# Patient Record
Sex: MALE | Race: WHITE | NOT HISPANIC OR LATINO | Employment: FULL TIME | URBAN - NONMETROPOLITAN AREA
[De-identification: names, ages, dates, MRNs, and addresses within clinical notes are randomized per-mention and may not be internally consistent; named-entity substitution may affect disease eponyms.]

---

## 2021-07-14 DIAGNOSIS — R19.4 CHANGE IN BOWEL HABIT: Primary | ICD-10-CM

## 2021-07-16 DIAGNOSIS — R19.4 CHANGE IN BOWEL HABIT: Primary | ICD-10-CM

## 2021-07-16 DIAGNOSIS — Z00.00 WELLNESS EXAMINATION: ICD-10-CM

## 2021-07-16 RX ORDER — LIDOCAINE HYDROCHLORIDE 10 MG/ML
1 INJECTION, SOLUTION EPIDURAL; INFILTRATION; INTRACAUDAL; PERINEURAL ONCE
Status: CANCELLED | OUTPATIENT
Start: 2021-07-16 | End: 2021-07-16

## 2021-07-16 RX ORDER — SODIUM CHLORIDE 0.9 % (FLUSH) 0.9 %
10 SYRINGE (ML) INJECTION
Status: CANCELLED | OUTPATIENT
Start: 2021-07-16

## 2021-07-16 RX ORDER — SODIUM CHLORIDE 9 MG/ML
INJECTION, SOLUTION INTRAVENOUS CONTINUOUS
Status: CANCELLED | OUTPATIENT
Start: 2021-07-22

## 2021-07-20 ENCOUNTER — ANESTHESIA EVENT (OUTPATIENT)
Dept: ENDOSCOPY | Facility: HOSPITAL | Age: 36
End: 2021-07-20
Payer: COMMERCIAL

## 2021-07-22 ENCOUNTER — HOSPITAL ENCOUNTER (OUTPATIENT)
Dept: PREADMISSION TESTING | Facility: HOSPITAL | Age: 36
Discharge: HOME | End: 2021-07-22
Attending: SURGERY
Payer: COMMERCIAL

## 2021-07-22 ENCOUNTER — LAB VISIT (OUTPATIENT)
Dept: LAB | Facility: HOSPITAL | Age: 36
End: 2021-07-22
Attending: SURGERY
Payer: COMMERCIAL

## 2021-07-22 VITALS — WEIGHT: 158 LBS | HEIGHT: 70 IN | BODY MASS INDEX: 22.62 KG/M2

## 2021-07-22 DIAGNOSIS — R19.4 CHANGE IN BOWEL HABIT: ICD-10-CM

## 2021-07-22 DIAGNOSIS — Z00.00 WELLNESS EXAMINATION: ICD-10-CM

## 2021-07-22 LAB
BILIRUB UR QL STRIP: NEGATIVE
CLARITY UR: CLEAR
COLOR UR: YELLOW
GLUCOSE UR QL STRIP: NEGATIVE
HGB UR QL STRIP: NEGATIVE
KETONES UR QL STRIP: NEGATIVE
LEUKOCYTE ESTERASE UR QL STRIP: NEGATIVE
NITRITE UR QL STRIP: NEGATIVE
PH UR STRIP: 8 [PH] (ref 5–8)
PROT UR QL STRIP: NEGATIVE
SP GR UR STRIP: 1.01 (ref 1–1.03)
URN SPEC COLLECT METH UR: NORMAL
UROBILINOGEN UR STRIP-ACNC: NEGATIVE EU/DL

## 2021-07-22 PROCEDURE — 81003 URINALYSIS AUTO W/O SCOPE: CPT | Performed by: SURGERY

## 2021-07-25 PROBLEM — R19.4 CHANGE IN BOWEL HABITS: Chronic | Status: ACTIVE | Noted: 2021-07-01

## 2021-07-26 ENCOUNTER — ANESTHESIA (OUTPATIENT)
Dept: ENDOSCOPY | Facility: HOSPITAL | Age: 36
End: 2021-07-26
Payer: COMMERCIAL

## 2021-07-26 ENCOUNTER — HOSPITAL ENCOUNTER (OUTPATIENT)
Facility: HOSPITAL | Age: 36
Discharge: HOME | End: 2021-07-26
Attending: SURGERY | Admitting: SURGERY
Payer: COMMERCIAL

## 2021-07-26 VITALS
RESPIRATION RATE: 20 BRPM | OXYGEN SATURATION: 100 % | SYSTOLIC BLOOD PRESSURE: 106 MMHG | TEMPERATURE: 98 F | HEART RATE: 62 BPM | DIASTOLIC BLOOD PRESSURE: 62 MMHG

## 2021-07-26 DIAGNOSIS — R19.4 CHANGE IN BOWEL HABIT: ICD-10-CM

## 2021-07-26 DIAGNOSIS — R19.4 CHANGE IN BOWEL HABITS: Primary | Chronic | ICD-10-CM

## 2021-07-26 DIAGNOSIS — Z01.818 PRE-OP TESTING: ICD-10-CM

## 2021-07-26 PROBLEM — K58.9 IRRITABLE BOWEL SYNDROME WITHOUT DIARRHEA: Chronic | Status: ACTIVE | Noted: 2021-07-26

## 2021-07-26 PROCEDURE — 37000008 HC ANESTHESIA 1ST 15 MINUTES: Performed by: SURGERY

## 2021-07-26 PROCEDURE — 45378 DIAGNOSTIC COLONOSCOPY: CPT | Performed by: SURGERY

## 2021-07-26 PROCEDURE — 25000003 PHARM REV CODE 250: Performed by: SURGERY

## 2021-07-26 PROCEDURE — 37000009 HC ANESTHESIA EA ADD 15 MINS: Performed by: SURGERY

## 2021-07-26 RX ORDER — PROPOFOL 10 MG/ML
VIAL (ML) INTRAVENOUS
Status: DISCONTINUED | OUTPATIENT
Start: 2021-07-26 | End: 2021-07-26

## 2021-07-26 RX ORDER — SODIUM CHLORIDE 9 MG/ML
INJECTION, SOLUTION INTRAVENOUS CONTINUOUS
Status: DISCONTINUED | OUTPATIENT
Start: 2021-07-26 | End: 2021-07-26 | Stop reason: HOSPADM

## 2021-07-26 RX ORDER — SODIUM CHLORIDE 9 MG/ML
INJECTION, SOLUTION INTRAVENOUS CONTINUOUS PRN
Status: DISCONTINUED | OUTPATIENT
Start: 2021-07-26 | End: 2021-07-26

## 2021-07-26 RX ORDER — LIDOCAINE HYDROCHLORIDE 10 MG/ML
1 INJECTION, SOLUTION EPIDURAL; INFILTRATION; INTRACAUDAL; PERINEURAL ONCE
Status: DISCONTINUED | OUTPATIENT
Start: 2021-07-26 | End: 2021-07-26 | Stop reason: HOSPADM

## 2021-07-26 RX ORDER — SODIUM CHLORIDE 0.9 % (FLUSH) 0.9 %
10 SYRINGE (ML) INJECTION
Status: DISCONTINUED | OUTPATIENT
Start: 2021-07-26 | End: 2021-07-26 | Stop reason: HOSPADM

## 2021-07-26 RX ORDER — LIDOCAINE HYDROCHLORIDE 10 MG/ML
INJECTION, SOLUTION INTRAVENOUS
Status: DISCONTINUED | OUTPATIENT
Start: 2021-07-26 | End: 2021-07-26

## 2021-07-26 RX ADMIN — Medication 50 MG: at 08:07

## 2021-07-26 RX ADMIN — Medication 150 MG: at 08:07

## 2021-07-26 RX ADMIN — SODIUM CHLORIDE: 0.9 INJECTION, SOLUTION INTRAVENOUS at 07:07

## 2021-07-26 RX ADMIN — LIDOCAINE HYDROCHLORIDE 50 MG: 10 INJECTION, SOLUTION INTRAVENOUS at 08:07

## 2021-07-30 ENCOUNTER — LAB VISIT (OUTPATIENT)
Dept: LAB | Facility: HOSPITAL | Age: 36
End: 2021-07-30
Attending: FAMILY MEDICINE
Payer: COMMERCIAL

## 2021-07-30 DIAGNOSIS — R11.0 NAUSEA: ICD-10-CM

## 2021-07-30 DIAGNOSIS — R51.9 HEAD ACHE: ICD-10-CM

## 2021-07-30 LAB — SARS-COV-2 RNA RESP QL NAA+PROBE: NOT DETECTED

## 2021-07-30 PROCEDURE — U0002 COVID-19 LAB TEST NON-CDC: HCPCS | Performed by: FAMILY MEDICINE

## 2021-08-09 PROBLEM — R53.82 CHRONIC FATIGUE: Status: ACTIVE | Noted: 2021-08-09

## 2021-08-09 PROBLEM — E55.9 VITAMIN D DEFICIENCY: Status: ACTIVE | Noted: 2021-08-09

## 2025-01-13 PROBLEM — Z76.89 ENCOUNTER TO ESTABLISH CARE: Status: ACTIVE | Noted: 2025-01-13

## 2025-01-31 PROBLEM — R73.01 IFG (IMPAIRED FASTING GLUCOSE): Status: ACTIVE | Noted: 2025-01-31

## 2025-01-31 PROBLEM — E78.5 HYPERLIPIDEMIA: Status: ACTIVE | Noted: 2025-01-31

## 2025-01-31 PROBLEM — R73.03 PREDIABETES: Status: ACTIVE | Noted: 2025-01-31

## 2025-01-31 PROBLEM — R00.1 SINUS BRADYCARDIA: Status: ACTIVE | Noted: 2025-01-31

## 2025-01-31 PROBLEM — Z00.00 ROUTINE GENERAL MEDICAL EXAMINATION AT A HEALTH CARE FACILITY: Status: ACTIVE | Noted: 2025-01-31

## 2025-07-28 ENCOUNTER — HOSPITAL ENCOUNTER (OUTPATIENT)
Facility: HOSPITAL | Age: 40
Discharge: HOME | End: 2025-07-29
Attending: EMERGENCY MEDICINE | Admitting: INTERNAL MEDICINE
Payer: COMMERCIAL

## 2025-07-28 DIAGNOSIS — I63.9 CEREBROVASCULAR ACCIDENT (CVA), UNSPECIFIED MECHANISM: Primary | ICD-10-CM

## 2025-07-28 DIAGNOSIS — R53.1 WEAKNESS: ICD-10-CM

## 2025-07-28 LAB
ABSOLUTE EOSINOPHIL (OHS): 0.23 K/UL
ABSOLUTE MONOCYTE (OHS): 0.67 K/UL (ref 0.3–1)
ABSOLUTE NEUTROPHIL COUNT (OHS): 4.04 K/UL (ref 1.8–7.7)
ALBUMIN SERPL BCP-MCNC: 4 G/DL (ref 3.5–5.2)
ALP SERPL-CCNC: 59 UNIT/L (ref 40–150)
ALT SERPL W/O P-5'-P-CCNC: 19 UNIT/L (ref 10–44)
ANION GAP (OHS): 8 MMOL/L (ref 8–16)
AST SERPL-CCNC: 24 UNIT/L (ref 11–45)
BASOPHILS # BLD AUTO: 0.05 K/UL
BASOPHILS NFR BLD AUTO: 0.7 %
BILIRUB SERPL-MCNC: 0.3 MG/DL (ref 0.1–1)
BUN SERPL-MCNC: 29 MG/DL (ref 6–20)
CALCIUM SERPL-MCNC: 8.4 MG/DL (ref 8.7–10.5)
CHLORIDE SERPL-SCNC: 104 MMOL/L (ref 95–110)
CO2 SERPL-SCNC: 26 MMOL/L (ref 23–29)
CREAT SERPL-MCNC: 1.1 MG/DL (ref 0.5–1.4)
ERYTHROCYTE [DISTWIDTH] IN BLOOD BY AUTOMATED COUNT: 12.3 % (ref 11.5–14.5)
GFR SERPLBLD CREATININE-BSD FMLA CKD-EPI: >60 ML/MIN/1.73/M2
GLUCOSE SERPL-MCNC: 99 MG/DL (ref 70–110)
HCT VFR BLD AUTO: 35.9 % (ref 40–54)
HGB BLD-MCNC: 12.3 GM/DL (ref 14–18)
IMM GRANULOCYTES # BLD AUTO: 0.03 K/UL (ref 0–0.04)
IMM GRANULOCYTES NFR BLD AUTO: 0.4 % (ref 0–0.5)
LYMPHOCYTES # BLD AUTO: 1.81 K/UL (ref 1–4.8)
MCH RBC QN AUTO: 32.3 PG (ref 27–31)
MCHC RBC AUTO-ENTMCNC: 34.3 G/DL (ref 32–36)
MCV RBC AUTO: 94 FL (ref 82–98)
NUCLEATED RBC (/100WBC) (OHS): 0 /100 WBC
PLATELET # BLD AUTO: 171 K/UL (ref 150–450)
PMV BLD AUTO: 10.1 FL (ref 9.2–12.9)
POCT GLUCOSE: 97 MG/DL (ref 70–110)
POTASSIUM SERPL-SCNC: 3.8 MMOL/L (ref 3.5–5.1)
PROT SERPL-MCNC: 6.6 GM/DL (ref 6–8.4)
RBC # BLD AUTO: 3.81 M/UL (ref 4.6–6.2)
RELATIVE EOSINOPHIL (OHS): 3.4 %
RELATIVE LYMPHOCYTE (OHS): 26.5 % (ref 18–48)
RELATIVE MONOCYTE (OHS): 9.8 % (ref 4–15)
RELATIVE NEUTROPHIL (OHS): 59.2 % (ref 38–73)
SODIUM SERPL-SCNC: 138 MMOL/L (ref 136–145)
WBC # BLD AUTO: 6.83 K/UL (ref 3.9–12.7)

## 2025-07-28 PROCEDURE — G0378 HOSPITAL OBSERVATION PER HR: HCPCS

## 2025-07-28 PROCEDURE — 82962 GLUCOSE BLOOD TEST: CPT

## 2025-07-28 PROCEDURE — 25000003 PHARM REV CODE 250: Performed by: INTERNAL MEDICINE

## 2025-07-28 PROCEDURE — 25500020 PHARM REV CODE 255: Performed by: EMERGENCY MEDICINE

## 2025-07-28 PROCEDURE — 80053 COMPREHEN METABOLIC PANEL: CPT | Performed by: EMERGENCY MEDICINE

## 2025-07-28 PROCEDURE — 99285 EMERGENCY DEPT VISIT HI MDM: CPT | Mod: 25

## 2025-07-28 PROCEDURE — 36415 COLL VENOUS BLD VENIPUNCTURE: CPT | Performed by: EMERGENCY MEDICINE

## 2025-07-28 PROCEDURE — 85025 COMPLETE CBC W/AUTO DIFF WBC: CPT | Performed by: EMERGENCY MEDICINE

## 2025-07-28 PROCEDURE — G0425 INPT/ED TELECONSULT30: HCPCS | Mod: GT,,, | Performed by: STUDENT IN AN ORGANIZED HEALTH CARE EDUCATION/TRAINING PROGRAM

## 2025-07-28 RX ORDER — SODIUM CHLORIDE 0.9 % (FLUSH) 0.9 %
10 SYRINGE (ML) INJECTION
Status: DISCONTINUED | OUTPATIENT
Start: 2025-07-28 | End: 2025-07-29 | Stop reason: HOSPADM

## 2025-07-28 RX ORDER — TALC
6 POWDER (GRAM) TOPICAL NIGHTLY PRN
Status: DISCONTINUED | OUTPATIENT
Start: 2025-07-28 | End: 2025-07-29 | Stop reason: HOSPADM

## 2025-07-28 RX ORDER — ASPIRIN 325 MG
325 TABLET ORAL DAILY
Status: DISCONTINUED | OUTPATIENT
Start: 2025-07-28 | End: 2025-07-29 | Stop reason: HOSPADM

## 2025-07-28 RX ORDER — ACETAMINOPHEN 325 MG/1
650 TABLET ORAL EVERY 8 HOURS PRN
Status: DISCONTINUED | OUTPATIENT
Start: 2025-07-28 | End: 2025-07-29 | Stop reason: HOSPADM

## 2025-07-28 RX ORDER — KETOROLAC TROMETHAMINE 30 MG/ML
15 INJECTION, SOLUTION INTRAMUSCULAR; INTRAVENOUS EVERY 6 HOURS PRN
Status: DISCONTINUED | OUTPATIENT
Start: 2025-07-28 | End: 2025-07-29 | Stop reason: HOSPADM

## 2025-07-28 RX ORDER — ATORVASTATIN CALCIUM 80 MG/1
80 TABLET, FILM COATED ORAL NIGHTLY
Status: DISCONTINUED | OUTPATIENT
Start: 2025-07-28 | End: 2025-07-29 | Stop reason: HOSPADM

## 2025-07-28 RX ORDER — ONDANSETRON HYDROCHLORIDE 2 MG/ML
4 INJECTION, SOLUTION INTRAVENOUS EVERY 8 HOURS PRN
Status: DISCONTINUED | OUTPATIENT
Start: 2025-07-28 | End: 2025-07-29 | Stop reason: HOSPADM

## 2025-07-28 RX ADMIN — ATORVASTATIN CALCIUM 80 MG: 80 TABLET, FILM COATED ORAL at 08:07

## 2025-07-28 RX ADMIN — ASPIRIN 325 MG ORAL TABLET 325 MG: 325 PILL ORAL at 07:07

## 2025-07-28 RX ADMIN — IOHEXOL 100 ML: 350 INJECTION, SOLUTION INTRAVENOUS at 05:07

## 2025-07-28 NOTE — SUBJECTIVE & OBJECTIVE
HPI:  39 y.o. male with a history of IBS, Vit D deficiency, chronic fatigue, prediabetes, HLD who presents with weakness in the right hand and confusion.   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 Symptoms improving.    Denies any pain.    No history of tobacco use.  Not on any antithrombotics.    Exam ->  Follows commands. No aphasia, dysarthria, facial weakness.  No drift in the UE or LE.   symmetric  Mild weakness with R wrist flexion/extension compared to the L     Images personally reviewed and interpreted:  Study: Head CT and CTA Head & Neck  Study Interpretation: No acute ischemia or hemorrhage. No HDV sign.  No evidence of web or dissection. No LVO/MeVO \     Assessment and plan:  38 y/o male wwith history as above who presents with right hand weakness and confusion.    His exam is essentially normal apart from possibly mild weakness with R wrist flexion/extension.  Given his mild and improving symptoms (able to  phone now), defer IV lytic therapy.    MRI brain    If acute ischemia seen ->  -- Load with ASA 325mg and Plavix 300mg and continue ASA 81mg and Plavix 75mg   -- Admit for stroke workup including TTE w/ bubble and PT/OT eval.  -- Vasc Neuro consult for further workup and recs.    Lytics recommendation: Thrombolytic therapy not recommended due to Mild Non-Disabling Symptoms and Symptoms improving.    Thrombectomy recommendation: No; No large vessel occlusion identified on imaging   Placement recommendation: pending further studies

## 2025-07-28 NOTE — ED PROVIDER NOTES
"Encounter Date: 7/28/2025       History     Chief Complaint   Patient presents with    Extremity Weakness     Patient to the ER with complaints of right hand weakness. Patient reports symptoms occurred while typing onset 15 minutes ago. States, "I was trying to do something on the computer but I couldn't control my right hand." Patient reports confusion.     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 began with right hand weakness, hard to right, hard to use a computer, states has improved somewhat since he has a arrived.  No history of this in the past.  States he was somewhat confused as well.  Now answers all questions appropriately.  Alert and oriented x4, GCS is 15        Review of patient's allergies indicates:  No Known Allergies  Past Medical History:   Diagnosis Date    Change in bowel habits 07/2021    Encounter to establish care 1/13/2025    H. pylori infection     Rectal bleeding 07/2021     Past Surgical History:   Procedure Laterality Date    COLONOSCOPY N/A 07/26/2021    Procedure: COLONOSCOPY;  Surgeon: Emanuel Ybarra MD;  Location: [facility];  Service: General;  Laterality: N/A;  cl/sb/tm/ah  4 8am    VASECTOMY      WISDOM TOOTH EXTRACTION       Family History   Problem Relation Name Age of Onset    No Known Problems Mother      No Known Problems Father      No Known Problems Sister       Social History[1]  Review of Systems   Constitutional:  Negative for fever.   HENT:  Negative for sore throat.    Respiratory:  Negative for shortness of breath.    Cardiovascular:  Negative for chest pain.   Gastrointestinal:  Negative for nausea.   Genitourinary:  Negative for dysuria.   Musculoskeletal:  Negative for back pain.   Skin:  Negative for rash.   Neurological:  Positive for weakness.   Hematological:  Does not bruise/bleed easily.   All other systems reviewed and are negative.      Physical Exam     Initial Vitals [07/28/25 1720]   BP Pulse Resp Temp " SpO2   (!) 143/79 78 18 98.5 °F (36.9 °C) 100 %      MAP       --         Physical Exam    Nursing note and vitals reviewed.  Constitutional: He appears well-developed and well-nourished. He is not diaphoretic. No distress.   HENT:   Head: Normocephalic and atraumatic.   Eyes: Conjunctivae and EOM are normal. Pupils are equal, round, and reactive to light. Right eye exhibits no discharge. Left eye exhibits no discharge. No scleral icterus.   Neck: Neck supple. No JVD present.   Normal range of motion.  Cardiovascular:  Normal rate, regular rhythm, normal heart sounds and intact distal pulses.           No murmur heard.  Pulmonary/Chest: Breath sounds normal. No stridor. No respiratory distress. He has no wheezes. He has no rhonchi. He has no rales. He exhibits no tenderness.   Abdominal: Abdomen is soft. Bowel sounds are normal. He exhibits no distension and no mass. There is no abdominal tenderness. There is no rebound and no guarding.   Musculoskeletal:         General: No tenderness or edema. Normal range of motion.      Cervical back: Normal range of motion and neck supple.     Neurological: He is alert and oriented to person, place, and time. He has normal strength. GCS score is 15. GCS eye subscore is 4. GCS verbal subscore is 5. GCS motor subscore is 6.   Patient appears to be moving hand well, not appear to be weak   Skin: Skin is warm and dry. Capillary refill takes less than 2 seconds.   Psychiatric:   Anxious male         ED Course   Critical Care    Date/Time: 7/28/2025 6:24 PM    Performed by: Lan Albarran Jr., MD  Authorized by: Lan Albarran Jr., MD  Direct patient critical care time: 30 minutes  Additional history critical care time: 15 minutes  Ordering / reviewing critical care time: 15 minutes  Documentation critical care time: 30 minutes  Consulting other physicians critical care time: 15 minutes  Total critical care time (exclusive of procedural time) : 105 minutes  Critical care was  necessary to treat or prevent imminent or life-threatening deterioration of the following conditions: cardiac failure, dehydration, sepsis, trauma, metabolic crisis, circulatory failure, endocrine crisis, renal failure, shock, toxidrome, respiratory failure, CNS failure or compromise and hepatic failure.  Critical care was time spent personally by me on the following activities: discussions with primary provider, discussions with consultants, evaluation of patient's response to treatment, examination of patient, obtaining history from patient or surrogate, ordering and performing treatments and interventions, ordering and review of laboratory studies, ordering and review of radiographic studies, pulse oximetry, re-evaluation of patient's condition and review of old charts.        Labs Reviewed   COMPREHENSIVE METABOLIC PANEL - Abnormal       Result Value    Sodium 138      Potassium 3.8      Chloride 104      CO2 26      Glucose 99      BUN 29 (*)     Creatinine 1.1      Calcium 8.4 (*)     Protein Total 6.6      Albumin 4.0      Bilirubin Total 0.3      ALP 59      AST 24      ALT 19      Anion Gap 8      eGFR >60     CBC WITH DIFFERENTIAL - Abnormal    WBC 6.83      RBC 3.81 (*)     HGB 12.3 (*)     HCT 35.9 (*)     MCV 94      MCH 32.3 (*)     MCHC 34.3      RDW 12.3      Platelet Count 171      MPV 10.1      Nucleated RBC 0      Neut % 59.2      Lymph % 26.5      Mono % 9.8      Eos % 3.4      Basophil % 0.7      Imm Grans % 0.4      Neut # 4.04      Lymph # 1.81      Mono # 0.67      Eos # 0.23      Baso # 0.05      Imm Grans # 0.03     CBC W/ AUTO DIFFERENTIAL    Narrative:     The following orders were created for panel order CBC auto differential.  Procedure                               Abnormality         Status                     ---------                               -----------         ------                     CBC with Differential[[id number]]       Abnormal            Final result                  Please view results for these tests on the individual orders.   POCT GLUCOSE    POCT Glucose 97            Imaging Results              CTA Head and Neck (xpd) (Final result)  Result time 07/28/25 18:22:44      Final result by Bairon Caban MD (07/28/25 18:22:44)                   Impression:      1. No evidence for significant vertebral artery or cervical carotid artery stenosis.  2. No evidence for intracranial aneurysm, stenosis, or significant vaso occlusive disease within the anterior or posterior circulation.      Electronically signed by: Bairon Caban MD  Date:    07/28/2025  Time:    18:22               Narrative:    EXAMINATION:  CTA HEAD AND NECK (XPD)    CLINICAL HISTORY:  Stroke/TIA, determine embolic source;    TECHNIQUE:  Helical CT images obtained from the thoracic inlet through the skull vertex after intravenous administration of contrast according to CTA protocol.  Maximum intensity projection images were evaluated in multiple planes along with the source images. Iterative reconstruction technique was used. NASCET criteria utilized for stenoses measurements.  CT/Cardiac nuclear examinations in the past 12 months: 0    COMPARISON:  Noncontrast CT head performed on today's date    FINDINGS:  Vasculature:    Neck: Imaged portions of the aortic arch are widely patent.  Brachiocephalic artery and bilateral subclavian arteries are widely patent.  Bilateral vertebral arteries originate from the subclavian arteries and are widely patent throughout their course.  Bilateral common carotid, external carotid, and cervical portions of the internal carotid arteries are widely patent.  The carotid bulbs are unremarkable bilaterally.    Head: Distal vertebral arteries supply an intact widely patent basilar artery.  Intact bilateral superior cerebellar and posterior cerebral arteries are seen arising from the distal basilar artery.  Intact left posterior communicating artery felt to be seen.  Right posterior  communicating artery is not clearly seen which may be related to congenital absence or hypoplasia.  Petrous, cavernous, and siphon portions of bilateral internal carotid arteries are widely patent.  Bilateral middle and anterior cerebral arteries are widely patent.  No detected evidence for aneurysm, stenosis, or occlusion within the anterior or posterior circulation.    Remaining structures:    Neck: Thyroid, bilateral submandibular, and bilateral parotid glands are symmetric and unremarkable.  Imaged lung apices are clear.  No detected cervical mass or evidence of cervical adenopathy.  Musculature of the floor of the mouth is midline.  Mucosal surfaces of the nasopharynx and oropharynx are relatively symmetric.  Epiglottis is normal in appearance.  Region of the vocal cords unremarkable.    Head: Paranasal sinuses demonstrate mucous retention cysts at the inferior maxillary sinuses.  Mastoid air cells are clear.  No interval developed midline shift or mass effect.  No enhancing intracranial mass is identified.                                       CT Head Without Contrast (Final result)  Result time 07/28/25 18:02:42      Final result by Bairon Caban MD (07/28/25 18:02:42)                   Impression:      No acute intracranial process detected.      Electronically signed by: Bairon Caban MD  Date:    07/28/2025  Time:    18:02               Narrative:    EXAMINATION:  CT HEAD WITHOUT CONTRAST    CLINICAL HISTORY:  Right hand weakness, confusion stroke, follow up;    TECHNIQUE:  Axial CT images were obtained from the skull base to the vertex without contrast. Iterative reconstruction technique was used.  CT/Cardiac nuclear examinations in the past 12 months: 0    COMPARISON:  No priors available    FINDINGS:  Ventricles and basal cisterns are midline and without effacement. No evidence of acute hemorrhage, midline shift, mass, or mass effect. No evidence of acute regional infarct. No detected extra-axial fluid  collections. Mucous retention cysts are present within bilateral maxillary sinuses largest measuring 7.5 mm.  Remainder of the imaged paranasal sinuses and mastoid air cells are clear.  Calvarium is intact.                                       Medications   iohexoL (OMNIPAQUE 350) injection 100 mL (100 mLs Intravenous Given 7/28/25 1566)     Medical Decision Making  Amount and/or Complexity of Data Reviewed  Labs: ordered.  Radiology: ordered.    Risk  Prescription drug management.  Decision regarding hospitalization.                           Medical Decision Making:   Differential Diagnosis:   Anxiety reaction, neuropathy  Clinical Tests:   Lab Tests: Ordered and Reviewed  The following lab test(s) were unremarkable: CBC and CMP  Radiological Study: Ordered and Reviewed  Medical Tests: Reviewed and Ordered  ED Management:  Discussed patient's presentation and CT report with on-call neurologist.  He recommends no tPA Plavix or aspirin.  He reports can not completely rule out any acute infarct to MRI.  Discussed with patient and family unable to currently patient will require admission for MRI in the morning.  Patient reports he has no current complaints of symptoms.  His primary care provider is .   Other:   I have discussed this case with another health care provider.       <> Summary of the Discussion: Discussed with on-call physician Dr. Whalen accepted admission                Clinical Impression:  Final diagnoses:  [I63.9] Cerebrovascular accident (CVA), unspecified mechanism (Primary)  [R53.1] Weakness          ED Disposition Condition    Admit                       [1]   Social History  Tobacco Use    Smoking status: Never    Smokeless tobacco: Never   Substance Use Topics    Alcohol use: Not Currently    Drug use: Never        Lan Albarran Jr., MD  07/28/25 [id number]

## 2025-07-28 NOTE — TELEMEDICINE CONSULT
[facility]  Vascular Neurology  Comprehensive Stroke Center  TeleVascular Neurology Acute Consultation Note        Consult Information  Consult to Telemedicine - Acute Stroke  Consult performed by: Christofer Tom MD  Consult ordered by: London Aguiar MD          Consulting Provider: LONDON AGUIAR   Current Providers  No providers found    Patient Location:  [facility] EMERGENCY DEPARTMENT Emergency Department    Spoke hospital nurse at bedside with patient assisting consultant.  Patient information was obtained from patient, past medical records, and ER records.       Stroke Documentation  Acute Stroke Times   Last Known Normal Date: 07/28/25  Last Known Normal Time: 1705  Stroke Team Called Date: 07/28/25  Stroke Team Called Time: 1734  Stroke Team Arrival Date: 07/28/25  Stroke Team Arrival Time: 1738  CT Interpretation Time: 1746  Thrombolytic Therapy Recommended: No  CTA Interpretation Time: 1754  Thrombectomy Recommended: No    NIH Scale:  1a. Level of Consciousness: 0-->Alert, keenly responsive  1b. LOC Questions: 0-->Answers both questions correctly  1c. LOC Commands: 0-->Performs both tasks correctly  2. Best Gaze: 0-->Normal  3. Visual: 0-->No visual loss  4. Facial Palsy: 0-->Normal symmetrical movements  5a. Motor Arm, Left: 0-->No drift, limb holds 90 (or 45) degrees for full 10 secs  5b. Motor Arm, Right: 0-->No drift, limb holds 90 (or 45) degrees for full 10 secs  6a. Motor Leg, Left: 0-->No drift, leg holds 30 degree position for full 5 secs  6b. Motor Leg, Right: 0-->No drift, leg holds 30 degree position for full 5 secs  7. Limb Ataxia: 0-->Absent  8. Sensory: 0-->Normal, no sensory loss  9. Best Language: 0-->No aphasia, normal  10. Dysarthria: 0-->Normal  11. Extinction and Inattention (formerly Neglect): 0-->No abnormality  Total (NIH Stroke Scale): 0      Modified Esteban Baseline: Score: 0  Modified Esteban Discharge:    Ade Coma Scale:     ABCD2 Score:   "  [id number] Score:    HAS -BLED Score:    ICH Score:    Hunt & Sosa Classification:      Blood pressure (!) 143/79, pulse 78, temperature 98.5 °F (36.9 °C), resp. rate 18, height 5' 10" (1.778 m), SpO2 100%.      In my opinion, this was a: Tier 1; VAN Stroke Assessment: Negative     Medical Decision Making  HPI:  39 y.o. male with a history of IBS, Vit D deficiency, chronic fatigue, prediabetes, HLD who presents with weakness in the right hand and confusion.   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 Symptoms improving.    Denies any pain.    No history of tobacco use.  Not on any antithrombotics.    Exam ->  Follows commands. No aphasia, dysarthria, facial weakness.  No drift in the UE or LE.   symmetric  Mild weakness with R wrist flexion/extension compared to the L     Images personally reviewed and interpreted:  Study: Head CT and CTA Head & Neck  Study Interpretation: No acute ischemia or hemorrhage. No HDV sign.  No evidence of web or dissection. No LVO/MeVO \     Assessment and plan:  38 y/o male wwith history as above who presents with right hand weakness and confusion.    His exam is essentially normal apart from possibly mild weakness with R wrist flexion/extension.  Given his mild and improving symptoms (able to  phone now), defer IV lytic therapy.    MRI brain    If acute ischemia seen ->  -- Load with ASA 325mg and Plavix 300mg and continue ASA 81mg and Plavix 75mg   -- Admit for stroke workup including TTE w/ bubble and PT/OT eval.  -- [facility] Neuro consult for further workup and recs.    Lytics recommendation: Thrombolytic therapy not recommended due to Mild Non-Disabling Symptoms and Symptoms improving.    Thrombectomy recommendation: No; No large vessel occlusion identified on imaging   Placement recommendation: pending further studies               ROS  Physical Exam  Past Medical History:   Diagnosis Date    " Change in bowel habits 07/2021    Encounter to establish care 1/13/2025    H. pylori infection     Rectal bleeding 07/2021     Past Surgical History:   Procedure Laterality Date    COLONOSCOPY N/A 07/26/2021    Procedure: COLONOSCOPY;  Surgeon: Emanuel Ybarra MD;  Location: [facility];  Service: General;  Laterality: N/A;  cl/sb/tm/ah  4 8am    VASECTOMY      WISDOM TOOTH EXTRACTION       Family History   Problem Relation Name Age of Onset    No Known Problems Mother      No Known Problems Father      No Known Problems Sister         Diagnoses  Weakness    Christofer Tom MD      Emergent/Acute neurological consultation requested by spoke provider due to critical concerns for possible cerebrovascular event that could result in permanent loss of neurologic/bodily function, severe disability or death of this patient.  Immediate/timely evaluation by a highly prepared expert is paramount for optimal outcomes  High risk for neurological deterioration if not properly diagnosed  High risk for neurological deterioration if not treated promplty/as soon as possible  Complex diagnostic evaluation may be required (advanced imaging)  High risk treatment options (thrombolytics and/or thrombectomy)    Patient care was coordinated with spoke provider, including but not limted to    Discussing likely diagnosis/etiology of symptoms  Making recommendations for further diagnostic studies  Making recommendations for intravenous thrombolytics or other advanced therapies  Making recommendations for disposition (admission/transfer for higher level of care)      Neurology consultation requested by spoke provider. Audiovisual encounter with the patient performed using a secure connection.  Results and impressions from the visit are documented on this note and were communicated to the consulting provider/team via direct communication. The note has been shared for addition to the patients electronic medical record.

## 2025-07-29 VITALS
TEMPERATURE: 98 F | HEIGHT: 70 IN | WEIGHT: 167.13 LBS | HEART RATE: 62 BPM | DIASTOLIC BLOOD PRESSURE: 56 MMHG | OXYGEN SATURATION: 98 % | RESPIRATION RATE: 18 BRPM | BODY MASS INDEX: 23.93 KG/M2 | SYSTOLIC BLOOD PRESSURE: 115 MMHG

## 2025-07-29 PROBLEM — R29.898 IMPAIRED DEXTERITY: Status: ACTIVE | Noted: 2025-07-29

## 2025-07-29 LAB
ABSOLUTE EOSINOPHIL (OHS): 0.33 K/UL
ABSOLUTE MONOCYTE (OHS): 0.64 K/UL (ref 0.3–1)
ABSOLUTE NEUTROPHIL COUNT (OHS): 3.05 K/UL (ref 1.8–7.7)
ALBUMIN SERPL BCP-MCNC: 3.8 G/DL (ref 3.5–5.2)
ALP SERPL-CCNC: 55 UNIT/L (ref 40–150)
ALT SERPL W/O P-5'-P-CCNC: 17 UNIT/L (ref 10–44)
ANION GAP (OHS): 9 MMOL/L (ref 8–16)
AST SERPL-CCNC: 21 UNIT/L (ref 11–45)
BASOPHILS # BLD AUTO: 0.05 K/UL
BASOPHILS NFR BLD AUTO: 0.9 %
BILIRUB SERPL-MCNC: 0.4 MG/DL (ref 0.1–1)
BUN SERPL-MCNC: 23 MG/DL (ref 6–20)
CALCIUM SERPL-MCNC: 8.2 MG/DL (ref 8.7–10.5)
CHLORIDE SERPL-SCNC: 108 MMOL/L (ref 95–110)
CHOLEST SERPL-MCNC: 157 MG/DL (ref 120–199)
CHOLEST/HDLC SERPL: 3.1 {RATIO} (ref 2–5)
CO2 SERPL-SCNC: 26 MMOL/L (ref 23–29)
CREAT SERPL-MCNC: 1.1 MG/DL (ref 0.5–1.4)
ERYTHROCYTE [DISTWIDTH] IN BLOOD BY AUTOMATED COUNT: 12.3 % (ref 11.5–14.5)
GFR SERPLBLD CREATININE-BSD FMLA CKD-EPI: >60 ML/MIN/1.73/M2
GLUCOSE SERPL-MCNC: 100 MG/DL (ref 70–110)
HCT VFR BLD AUTO: 37.6 % (ref 40–54)
HDLC SERPL-MCNC: 51 MG/DL (ref 40–75)
HDLC SERPL: 32.5 % (ref 20–50)
HGB BLD-MCNC: 12.6 GM/DL (ref 14–18)
IMM GRANULOCYTES # BLD AUTO: 0.01 K/UL (ref 0–0.04)
IMM GRANULOCYTES NFR BLD AUTO: 0.2 % (ref 0–0.5)
LDLC SERPL CALC-MCNC: 96 MG/DL (ref 63–159)
LYMPHOCYTES # BLD AUTO: 1.69 K/UL (ref 1–4.8)
MAGNESIUM SERPL-MCNC: 2.1 MG/DL (ref 1.6–2.6)
MCH RBC QN AUTO: 31.7 PG (ref 27–31)
MCHC RBC AUTO-ENTMCNC: 33.5 G/DL (ref 32–36)
MCV RBC AUTO: 95 FL (ref 82–98)
NONHDLC SERPL-MCNC: 106 MG/DL
NUCLEATED RBC (/100WBC) (OHS): 0 /100 WBC
PLATELET # BLD AUTO: 192 K/UL (ref 150–450)
PMV BLD AUTO: 10.7 FL (ref 9.2–12.9)
POTASSIUM SERPL-SCNC: 3.6 MMOL/L (ref 3.5–5.1)
PROT SERPL-MCNC: 6.1 GM/DL (ref 6–8.4)
RBC # BLD AUTO: 3.97 M/UL (ref 4.6–6.2)
RELATIVE EOSINOPHIL (OHS): 5.7 %
RELATIVE LYMPHOCYTE (OHS): 29.3 % (ref 18–48)
RELATIVE MONOCYTE (OHS): 11.1 % (ref 4–15)
RELATIVE NEUTROPHIL (OHS): 52.8 % (ref 38–73)
SODIUM SERPL-SCNC: 143 MMOL/L (ref 136–145)
TRIGL SERPL-MCNC: 50 MG/DL (ref 30–150)
WBC # BLD AUTO: 5.77 K/UL (ref 3.9–12.7)

## 2025-07-29 PROCEDURE — 80053 COMPREHEN METABOLIC PANEL: CPT | Performed by: NURSE PRACTITIONER

## 2025-07-29 PROCEDURE — G0378 HOSPITAL OBSERVATION PER HR: HCPCS

## 2025-07-29 PROCEDURE — 25000003 PHARM REV CODE 250: Performed by: NURSE PRACTITIONER

## 2025-07-29 PROCEDURE — 80061 LIPID PANEL: CPT | Performed by: NURSE PRACTITIONER

## 2025-07-29 PROCEDURE — 36415 COLL VENOUS BLD VENIPUNCTURE: CPT | Performed by: NURSE PRACTITIONER

## 2025-07-29 PROCEDURE — 83735 ASSAY OF MAGNESIUM: CPT | Performed by: NURSE PRACTITIONER

## 2025-07-29 PROCEDURE — 85025 COMPLETE CBC W/AUTO DIFF WBC: CPT | Performed by: NURSE PRACTITIONER

## 2025-07-29 RX ADMIN — ASPIRIN 325 MG ORAL TABLET 325 MG: 325 PILL ORAL at 08:07

## 2025-07-29 NOTE — NURSING
Arrived to [facility] via w/c per KHURRAM Peoples, pt is AAO x 4, ambulated to bed no deficits noted, initial assessment obtained - see flow sheets, educated pt on CB use, oriented to room, pt denies pain/needs at this time, care plan ongoing.

## 2025-07-29 NOTE — ED NOTES
"CT report received in ED for Cervical Spine w/o contrast. Spoke with Faby, primary nurse on [facility] and provided results of "no acute cervical spine fx or dislocation". She verbalized understanding and states she did not need the physical report at this time.   "

## 2025-07-29 NOTE — DISCHARGE SUMMARY
"[facility] Medicine  Discharge Summary      Patient Name: Rufino Valles  MRN: [id number]  SNEHA: [id number]  Patient Class: OP- Observation  Admission Date: 7/28/2025  Hospital Length of Stay: 0 days  Discharge Date and Time: 07/29/2025 10:29 AM  Attending Physician: James Bradshaw Jr., MD   Discharging Provider: JUNG Lara  Primary Care Provider: James Bradshaw Jr., MD    Primary Care Team: Networked reference to record PCT     HPI:     Encounter Date: 7/28/2025        History           Chief Complaint   Patient presents with    Extremity Weakness       Patient to the ER with complaints of right hand weakness. Patient reports symptoms occurred while typing onset 15 minutes ago. States, "I was trying to do something on the computer but I couldn't control my right hand." Patient reports confusion.      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 began with right hand weakness, hard to right, hard to use a computer, states has improved somewhat since he has a arrived.  No history of this in the past.  States he was somewhat confused as well.  Now answers all questions appropriately.  Alert and oriented x4, GCS is 15          7/29/25:  Patient presented to ER last night for hand weakness with decreased dexterity, telestroke performed and CT head/neck negative for acute changes.  Recommended admission for MRI in the am.  Labs overall unremarkable, mildly reduced calcium but no diffuse muscle weakness noted.  Lipid panel stable.  Mild anemia, but stable overnight.      * No surgery found *      Hospital Course:   7/29/25:  MRI negative.  Will discharge home.  Further work up from neurology if symptoms recur.       Goals of Care Treatment Preferences:  Code Status: Full Code         Consults:   Consults (From admission, onward)          Status Ordering Provider     Consult to Telemedicine - Acute Stroke  Once        Provider:  Christofer Tom " MD Pam    Completed MARLYN AGUIAR            Assessment & Plan  Hyperlipidemia  7/29/25:  Statin for now.  If MRI negative, continue fish oil at discharge.     Impaired dexterity  7/29/25:  Patient with decreased strength and dexterity to R hand, CT scans negative, pending MRI imaging this am.  CT neck to rule out cervical pathology.    Continue aspirin/statin for now.      Addendum:  CT neck and MRI head negative.  Consider neuro work up for recurrent symptoms.  Suspect nerve impingement rather than acute ischemic event.    Final Active Diagnoses:    Diagnosis Date Noted POA    PRINCIPAL PROBLEM:  Impaired dexterity [R29.898] 07/29/2025 Yes    Hyperlipidemia [E78.5] 01/31/2025 Yes      Problems Resolved During this Admission:       Discharged Condition: good    Disposition: Home or Self Care    Follow Up:   Follow-up Information       James Bradshaw Jr., MD Follow up in 5 day(s).    Specialty: Internal Medicine  Contact information:  [address] [place] [id number] [phone]                           Patient Instructions:      Diet Adult Regular       Significant Diagnostic Studies: Labs: All labs within the past 24 hours have been reviewed    Pending Diagnostic Studies:       None           Medications:  Reconciled Home Medications:      Medication List        CONTINUE taking these medications      omega-3 acid ethyl esters 1 gram capsule  Commonly known as: LOVAZA  Take 2 capsules (2 g total) by mouth once daily.            STOP taking these medications      DEXCOM G7  Misc  Generic drug: blood-glucose,,cont     DEXCOM G7 SENSOR Adele  Generic drug: blood-glucose sensor              Indwelling Lines/Drains at time of discharge:   Lines/Drains/Airways       None                       Time spent on the discharge of patient: 30 minutes         JUNG Lara  Department of Hospital Medicine  [facility]

## 2025-07-29 NOTE — PLAN OF CARE
[place] [facility]  Discharge Final Note    Primary Care Provider: James Bradshaw Jr., MD    Expected Discharge Date: 7/29/2025    Final Discharge Note (most recent)       Final Note - 07/29/25 1059          Final Note    Assessment Type Final Discharge Note     Anticipated Discharge Disposition Home or Self Care     What phone number can be called within the next 1-3 days to see how you are doing after discharge? [phone]     Hospital Resources/Appts/Education Provided Appointments scheduled and added to AVS        Post-Acute Status    Post-Acute Authorization Other     Other Status No Post-Acute Service Needs     Discharge Delays None known at this time                     Important Message from Medicare             Contact Info       James Bradshaw Jr., MD   Specialty: Internal Medicine   Relationship: PCP - General    [address] [place] [address]   Phone: [phone]       Next Steps: Follow up on 7/31/2025    Instructions: at 11:00 am

## 2025-07-29 NOTE — PLAN OF CARE
[facility]  Initial Discharge Assessment       Primary Care Provider: James Bradshaw Jr., MD    Admission Diagnosis: Weakness [R53.1]  Cerebrovascular accident (CVA), unspecified mechanism [I63.9]    Admission Date: 7/28/2025  Expected Discharge Date: 7/29/2025         Payor: AETNA / Plan: [employer]ET[employer] CHOICE POS / Product Type: Commercial /     Extended Emergency Contact Information  Primary Emergency Contact: Jeannie Valles  Address: [address] [place] [address]  Home Phone: [phone]  Mobile Phone: [phone]  Relation: Spouse  Preferred language: English   needed? No    Discharge Plan A: Home  Discharge Plan B: Home      [id number] - [place], [place] - [address] [address]  [address] [address]  Phone: [phone] Fax: [phone]      Initial Assessment (most recent)       Adult Discharge Assessment - 07/29/25 1032          Discharge Assessment    Assessment Type Discharge Planning Assessment     Prior to hospitilization cognitive status: Alert/Oriented     Current cognitive status: Alert/Oriented     Walking or Climbing Stairs Difficulty no     Dressing/Bathing Difficulty no     Equipment Currently Used at Home none     Discharge Plan A Home     Discharge Plan B Home     DME Needed Upon Discharge  none                      Patient found medically stable for discharge. Patient is independent and does not require any assistance from CM at this time.

## 2025-07-29 NOTE — HOSPITAL COURSE
7/29/25:  MRI negative.  Will discharge home.  Further work up from neurology if symptoms recur.

## 2025-07-29 NOTE — H&P
"  [facility] Medicine  History & Physical    Patient Name: Rufino Valles  MRN: [id number]  Patient Class: OP- Observation  Admission Date: 7/28/2025  Attending Physician: James Bradshaw Jr., MD   Primary Care Provider: James Bradshaw Jr., MD         Patient information was obtained from patient, past medical records, and ER records.     Subjective:     Principal Problem:<principal problem not specified>    Chief Complaint:   Chief Complaint   Patient presents with    Extremity Weakness     Patient to the ER with complaints of right hand weakness. Patient reports symptoms occurred while typing onset 15 minutes ago. States, "I was trying to do something on the computer but I couldn't control my right hand." Patient reports confusion.        HPI:     Encounter Date: 7/28/2025        History           Chief Complaint   Patient presents with    Extremity Weakness       Patient to the ER with complaints of right hand weakness. Patient reports symptoms occurred while typing onset 15 minutes ago. States, "I was trying to do something on the computer but I couldn't control my right hand." Patient reports confusion.      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 began with right hand weakness, hard to right, hard to use a computer, states has improved somewhat since he has a arrived.  No history of this in the past.  States he was somewhat confused as well.  Now answers all questions appropriately.  Alert and oriented x4, GCS is 15          7/29/25:  Patient presented to ER last night for hand weakness with decreased dexterity, telestroke performed and CT head/neck negative for acute changes.  Recommended admission for MRI in the am.  Labs overall unremarkable, mildly reduced calcium but no diffuse muscle weakness noted.  Lipid panel stable.  Mild anemia, but stable overnight.      Past Medical History:   Diagnosis Date    Change in bowel habits 07/2021 "    Encounter to establish care 1/13/2025    H. pylori infection     Rectal bleeding 07/2021       Past Surgical History:   Procedure Laterality Date    COLONOSCOPY N/A 07/26/2021    Procedure: COLONOSCOPY;  Surgeon: Emanuel Ybarra MD;  Location: [facility];  Service: General;  Laterality: N/A;  cl/sb/tm/ah  4 8am    VASECTOMY      WISDOM TOOTH EXTRACTION         Review of patient's allergies indicates:  No Known Allergies    No current facility-administered medications on file prior to encounter.     Current Outpatient Medications on File Prior to Encounter   Medication Sig    omega-3 acid ethyl esters (LOVAZA) 1 gram capsule Take 2 capsules (2 g total) by mouth once daily.    blood-glucose meter,continuous (DEXCOM G7 ) Misc 1 Device by Misc.(Non-Drug; Combo Route) route once. for 1 dose    blood-glucose sensor (DEXCOM G7 SENSOR) Adele 1 Device by Misc.(Non-Drug; Combo Route) route every 10 days.     Family History       Problem Relation (Age of Onset)    No Known Problems Mother, Father, Sister          Tobacco Use    Smoking status: Never    Smokeless tobacco: Never   Substance and Sexual Activity    Alcohol use: Not Currently    Drug use: Never    Sexual activity: Not on file     Review of Systems   Constitutional: Negative.    HENT: Negative.     Eyes: Negative.    Respiratory:  Negative for cough, chest tightness, shortness of breath and wheezing.    Cardiovascular:  Negative for chest pain, palpitations and leg swelling.   Gastrointestinal:  Negative for abdominal distention, abdominal pain, diarrhea, nausea and vomiting.   Endocrine: Negative.    Genitourinary: Negative.    Musculoskeletal: Negative.    Skin: Negative.    Allergic/Immunologic: Negative.    Neurological:  Positive for weakness.   Hematological: Negative.    Psychiatric/Behavioral: Negative.       Objective:     Vital Signs (Most Recent):  Temp: 97.5 °F (36.4 °C) (07/29/25 0736)  Pulse: 62 (07/29/25 0736)  Resp: 18 (07/29/25 0736)  BP:  (!) 115/56 (07/29/25 0736)  SpO2: 98 % (07/29/25 0736) Vital Signs (24h Range):  Temp:  [97.5 °F (36.4 °C)-98.5 °F (36.9 °C)] 97.5 °F (36.4 °C)  Pulse:  [51-78] 62  Resp:  [12-18] 18  SpO2:  [96 %-100 %] 98 %  BP: (105-143)/(52-79) 115/56     Weight: 75.8 kg (167 lb 1.7 oz)  Body mass index is 23.98 kg/m².     Physical Exam  Vitals reviewed.   Constitutional:       General: He is not in acute distress.     Appearance: Normal appearance.   HENT:      Head: Normocephalic and atraumatic.      Nose: Nose normal.      Mouth/Throat:      Pharynx: Oropharynx is clear.   Eyes:      Extraocular Movements: Extraocular movements intact.      Conjunctiva/sclera: Conjunctivae normal.      Pupils: Pupils are equal, round, and reactive to light.   Cardiovascular:      Rate and Rhythm: Regular rhythm. Bradycardia present.      Heart sounds: Normal heart sounds, S1 normal and S2 normal.   Pulmonary:      Effort: Pulmonary effort is normal.      Breath sounds: Normal breath sounds. No rales.   Abdominal:      General: Bowel sounds are normal. There is no distension.      Palpations: Abdomen is soft.   Musculoskeletal:         General: Normal range of motion.      Cervical back: Normal range of motion and neck supple.      Right lower leg: No edema.      Left lower leg: No edema.      Comments: Decreased strength and reduced dexterity to the right hand    Skin:     General: Skin is warm and dry.   Neurological:      General: No focal deficit present.      Mental Status: He is alert.   Psychiatric:         Mood and Affect: Mood normal.         Behavior: Behavior normal.         Thought Content: Thought content normal.         Judgment: Judgment normal.              CRANIAL NERVES     CN III, IV, VI   Pupils are equal, round, and reactive to light.       Significant Labs: All pertinent labs within the past 24 hours have been reviewed.    Significant Imaging: I have reviewed all pertinent imaging results/findings within the past 24  hours.  Assessment/Plan:     Assessment & Plan  Hyperlipidemia  7/29/25:  Statin for now.  If MRI negative, continue fish oil at discharge.     Impaired dexterity  7/29/25:  Patient with decreased strength and dexterity to R hand, CT scans negative, pending MRI imaging this am.  CT neck to rule out cervical pathology.    Continue aspirin/statin for now.      VTE Risk Mitigation (From admission, onward)           Ordered     IP VTE HIGH RISK PATIENT  Once         07/28/25 1959     Place sequential compression device  Until discontinued         07/28/25 1959                    SDOH Screening:  The patient declined to be screened for utility difficulties, food insecurity, transport difficulties, housing insecurity, and interpersonal safety, so no concerns could be identified this admission.                   On 07/29/2025, patient should be placed in hospital observation services under my care.           JUNG Lara  Department of Hospital Medicine  [facility]

## 2025-07-29 NOTE — PLAN OF CARE
Pt admitted from ED as Op-Observation for Cerebrovascular accident (CVA), unspecified mechanism. Plan of care reviewed with patient. Peripheral IV in place and saline locked. Pt tolerated medications well.  Pt has been alert and oriented throughout the night. Pt regaining use of right hand; able to grab medication (pill) and bring to mouth. Pt denied any pains, headaches, or dizziness. Pt rested throughout the night. Vital signs stable, No acute distress noted.  Pt free from falls and injury. Questions and concerns addressed.  Pt belongings and call bell with in reach.    Problem: Adult Inpatient Plan of Care  Goal: Plan of Care Review  Outcome: Ongoing  Goal: Patient-Specific Goal (Individualized)  Outcome: Ongoing  Goal: Absence of Hospital-Acquired Illness or Injury  Outcome: Ongoing  Goal: Optimal Comfort and Wellbeing  Outcome: Ongoing  Goal: Readiness for Transition of Care  Outcome: Ongoing

## 2025-07-29 NOTE — ASSESSMENT & PLAN NOTE
7/29/25:  Patient with decreased strength and dexterity to R hand, CT scans negative, pending MRI imaging this am.  CT neck to rule out cervical pathology.    Continue aspirin/statin for now.      Addendum:  CT neck and MRI head negative.  Consider neuro work up for recurrent symptoms.  Suspect nerve impingement rather than acute ischemic event.

## 2025-07-29 NOTE — ASSESSMENT & PLAN NOTE
7/29/25:  Patient with decreased strength and dexterity to R hand, CT scans negative, pending MRI imaging this am.  CT neck to rule out cervical pathology.    Continue aspirin/statin for now.

## 2025-07-29 NOTE — SUBJECTIVE & OBJECTIVE
Past Medical History:   Diagnosis Date    Change in bowel habits 07/2021    Encounter to establish care 1/13/2025    H. pylori infection     Rectal bleeding 07/2021       Past Surgical History:   Procedure Laterality Date    COLONOSCOPY N/A 07/26/2021    Procedure: COLONOSCOPY;  Surgeon: Emanuel Ybarra MD;  Location: [facility];  Service: General;  Laterality: N/A;  cl/sb/tm/ah  4 8am    VASECTOMY      WISDOM TOOTH EXTRACTION         Review of patient's allergies indicates:  No Known Allergies    No current facility-administered medications on file prior to encounter.     Current Outpatient Medications on File Prior to Encounter   Medication Sig    omega-3 acid ethyl esters (LOVAZA) 1 gram capsule Take 2 capsules (2 g total) by mouth once daily.    blood-glucose meter,continuous (DEXCOM G7 ) Misc 1 Device by Misc.(Non-Drug; Combo Route) route once. for 1 dose    blood-glucose sensor (DEXCOM G7 SENSOR) Adele 1 Device by Misc.(Non-Drug; Combo Route) route every 10 days.     Family History       Problem Relation (Age of Onset)    No Known Problems Mother, Father, Sister          Tobacco Use    Smoking status: Never    Smokeless tobacco: Never   Substance and Sexual Activity    Alcohol use: Not Currently    Drug use: Never    Sexual activity: Not on file     Review of Systems   Constitutional: Negative.    HENT: Negative.     Eyes: Negative.    Respiratory:  Negative for cough, chest tightness, shortness of breath and wheezing.    Cardiovascular:  Negative for chest pain, palpitations and leg swelling.   Gastrointestinal:  Negative for abdominal distention, abdominal pain, diarrhea, nausea and vomiting.   Endocrine: Negative.    Genitourinary: Negative.    Musculoskeletal: Negative.    Skin: Negative.    Allergic/Immunologic: Negative.    Neurological:  Positive for weakness.   Hematological: Negative.    Psychiatric/Behavioral: Negative.       Objective:     Vital Signs (Most Recent):  Temp: 97.5 °F (36.4 °C)  (07/29/25 0736)  Pulse: 62 (07/29/25 0736)  Resp: 18 (07/29/25 0736)  BP: (!) 115/56 (07/29/25 0736)  SpO2: 98 % (07/29/25 0736) Vital Signs (24h Range):  Temp:  [97.5 °F (36.4 °C)-98.5 °F (36.9 °C)] 97.5 °F (36.4 °C)  Pulse:  [51-78] 62  Resp:  [12-18] 18  SpO2:  [96 %-100 %] 98 %  BP: (105-143)/(52-79) 115/56     Weight: 75.8 kg (167 lb 1.7 oz)  Body mass index is 23.98 kg/m².     Physical Exam  Vitals reviewed.   Constitutional:       General: He is not in acute distress.     Appearance: Normal appearance.   HENT:      Head: Normocephalic and atraumatic.      Nose: Nose normal.      Mouth/Throat:      Pharynx: Oropharynx is clear.   Eyes:      Extraocular Movements: Extraocular movements intact.      Conjunctiva/sclera: Conjunctivae normal.      Pupils: Pupils are equal, round, and reactive to light.   Cardiovascular:      Rate and Rhythm: Regular rhythm. Bradycardia present.      Heart sounds: Normal heart sounds, S1 normal and S2 normal.   Pulmonary:      Effort: Pulmonary effort is normal.      Breath sounds: Normal breath sounds. No rales.   Abdominal:      General: Bowel sounds are normal. There is no distension.      Palpations: Abdomen is soft.   Musculoskeletal:         General: Normal range of motion.      Cervical back: Normal range of motion and neck supple.      Right lower leg: No edema.      Left lower leg: No edema.      Comments: Decreased strength and reduced dexterity to the right hand    Skin:     General: Skin is warm and dry.   Neurological:      General: No focal deficit present.      Mental Status: He is alert.   Psychiatric:         Mood and Affect: Mood normal.         Behavior: Behavior normal.         Thought Content: Thought content normal.         Judgment: Judgment normal.              CRANIAL NERVES     CN III, IV, VI   Pupils are equal, round, and reactive to light.       Significant Labs: All pertinent labs within the past 24 hours have been reviewed.    Significant Imaging: I  have reviewed all pertinent imaging results/findings within the past 24 hours.

## 2025-07-29 NOTE — DISCHARGE INSTRUCTIONS
Our goal at [facility] is to always give you outstanding care and exceptional service. You may receive a survey from [employer] by mail, text or e-mail in the next 24-48 hours asking about the care you received with us. The survey should only take 5-10 minutes to complete and is very important to us.     Your feedback provides us with a way to recognize our staff who work tirelessly to provide the best care! Also, your responses help us learn how to improve when your experience was below our aspiration of excellence. We are always looking for ways to improve your stay. We WILL use your feedback to continue making improvements to help us provide the highest quality care. We keep your personal information and feedback confidential. We appreciate your time completing this survey and can't wait to hear from you!!!    We look forward to your continued care with us! Thanks so much for choosing [facility] for your healthcare needs!

## 2025-07-29 NOTE — PLAN OF CARE
Patient being discharged. All education done via phone with virtual nursing. Patient ambulated off unit accompanied by mother with all belongings in hand. No questions or concerns.

## 2025-07-29 NOTE — HPI
"  Encounter Date: 7/28/2025        History           Chief Complaint   Patient presents with    Extremity Weakness       Patient to the ER with complaints of right hand weakness. Patient reports symptoms occurred while typing onset 15 minutes ago. States, "I was trying to do something on the computer but I couldn't control my right hand." Patient reports confusion.      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 began with right hand weakness, hard to right, hard to use a computer, states has improved somewhat since he has a arrived.  No history of this in the past.  States he was somewhat confused as well.  Now answers all questions appropriately.  Alert and oriented x4, GCS is 15          7/29/25:  Patient presented to ER last night for hand weakness with decreased dexterity, telestroke performed and CT head/neck negative for acute changes.  Recommended admission for MRI in the am.  Labs overall unremarkable, mildly reduced calcium but no diffuse muscle weakness noted.  Lipid panel stable.  Mild anemia, but stable overnight.    "

## 2025-08-01 ENCOUNTER — PATIENT OUTREACH (OUTPATIENT)
Dept: ADMINISTRATIVE | Facility: CLINIC | Age: 40
End: 2025-08-01
Payer: COMMERCIAL

## 2025-08-01 NOTE — PROGRESS NOTES
C3 nurse spoke with Rufino Valles (Spouse, Jeannie) for a TCC post hospital discharge follow up call. The patient has a scheduled HOSFU appointment with James Bradshaw Jr., MD on 07/31/2025 @ 1100.    Non-[facility] PCP.

## (undated) DEVICE — SEE MEDLINE ITEM 153215

## (undated) DEVICE — UNDERPAD DISPOSABLE 30X30IN

## (undated) DEVICE — LINER SUCTION CANNISTER REGUGA

## (undated) DEVICE — CONNECTOR TORRENT SCP OLYMPUS

## (undated) DEVICE — KIT VIA CUSTOM PROCEDURE

## (undated) DEVICE — TUBE SUC UNIVERSAL .25XIN 6FT

## (undated) DEVICE — SPONGE DRY VIA GREEN

## (undated) DEVICE — SYR SLIP TIP 60 CC DISP

## (undated) DEVICE — SOL IRRI STRL WATER 1000ML

## (undated) DEVICE — TUBING OXYGEN CONNECT BUBBLE

## (undated) DEVICE — KIT BIOGUARD AIR WTR SUC VALVE

## (undated) DEVICE — GOWN SURGICAL BRTHBL XL

## (undated) DEVICE — BASIN EMESIS GRAPHITE 500ML

## (undated) DEVICE — SYS AQUASHIELD WATTER BOTTLE

## (undated) DEVICE — ELECTRODE FOAM 535 TEARDROP